# Patient Record
Sex: FEMALE | Race: WHITE | NOT HISPANIC OR LATINO | URBAN - METROPOLITAN AREA
[De-identification: names, ages, dates, MRNs, and addresses within clinical notes are randomized per-mention and may not be internally consistent; named-entity substitution may affect disease eponyms.]

---

## 2021-05-18 ENCOUNTER — EMERGENCY (EMERGENCY)
Facility: HOSPITAL | Age: 26
LOS: 1 days | Discharge: HOME | End: 2021-05-18
Attending: EMERGENCY MEDICINE | Admitting: EMERGENCY MEDICINE
Payer: MEDICAID

## 2021-05-18 VITALS
DIASTOLIC BLOOD PRESSURE: 64 MMHG | OXYGEN SATURATION: 99 % | SYSTOLIC BLOOD PRESSURE: 112 MMHG | RESPIRATION RATE: 18 BRPM | TEMPERATURE: 98 F | WEIGHT: 119.93 LBS | HEART RATE: 74 BPM | HEIGHT: 64 IN

## 2021-05-18 DIAGNOSIS — S00.83XA CONTUSION OF OTHER PART OF HEAD, INITIAL ENCOUNTER: ICD-10-CM
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
## 2021-05-18 DIAGNOSIS — S09.90XA UNSPECIFIED INJURY OF HEAD, INITIAL ENCOUNTER: ICD-10-CM

## 2021-05-18 PROCEDURE — 99283 EMERGENCY DEPT VISIT LOW MDM: CPT

## 2021-05-18 RX ORDER — ACETAMINOPHEN 500 MG
650 TABLET ORAL ONCE
Refills: 0 | Status: COMPLETED | OUTPATIENT
Start: 2021-05-18 | End: 2021-05-18

## 2021-05-18 RX ORDER — METHOCARBAMOL 500 MG/1
2 TABLET, FILM COATED ORAL
Qty: 15 | Refills: 0
Start: 2021-05-18 | End: 2021-05-22

## 2021-05-18 RX ORDER — IBUPROFEN 200 MG
600 TABLET ORAL ONCE
Refills: 0 | Status: COMPLETED | OUTPATIENT
Start: 2021-05-18 | End: 2021-05-18

## 2021-05-18 RX ORDER — METHOCARBAMOL 500 MG/1
1000 TABLET, FILM COATED ORAL ONCE
Refills: 0 | Status: COMPLETED | OUTPATIENT
Start: 2021-05-18 | End: 2021-05-18

## 2021-05-18 RX ADMIN — METHOCARBAMOL 1000 MILLIGRAM(S): 500 TABLET, FILM COATED ORAL at 15:58

## 2021-05-18 RX ADMIN — Medication 650 MILLIGRAM(S): at 15:58

## 2021-05-18 RX ADMIN — Medication 600 MILLIGRAM(S): at 15:58

## 2021-05-18 NOTE — ED ADULT NURSE NOTE - PRO INTERPRETER NEED 2
Bedside and Verbal shift change report given to KAITLIN Valdez Nurse RN  (oncoming nurse) by DENTON Clemente LPN (offgoing nurse). Report given with SBAR, Kardex, Intake/Output, MAR and Recent Results. English

## 2021-05-18 NOTE — ED PROVIDER NOTE - CLINICAL SUMMARY MEDICAL DECISION MAKING FREE TEXT BOX
Patient presenting with minor head injury, concussion precautions provided as well as reassurance. Will give some PO pain meds as her sides of neck are starting to get tight.

## 2021-05-18 NOTE — ED ADULT TRIAGE NOTE - CHIEF COMPLAINT QUOTE
pt. s/p mechanical fall hitting her head on the concrete while working out, small area of redness noted. Pt. denies LOC, nausea, vomiting or change in vision. No other visible injuries.

## 2021-05-18 NOTE — ED PROVIDER NOTE - OBJECTIVE STATEMENT
25 F here with head injury- she was doing aerial yoga and fell about 1 foot to the ground, hit her head. No LOC, + small bump, no n/v. She was a littl dizzy and shaken up at the time. The sides of her neck ar starting to get tight. No other complaints. No other injuries. Didn't take anything for it.

## 2021-05-18 NOTE — ED ADULT NURSE NOTE - OBJECTIVE STATEMENT
A&Ox3 pt presents c/o 2/10 headache 2ndary to head injury.  Pt denies LOC, dizziness or visual change.  Pt ambulates w/ steady gait.  No neurological deficit noted.

## 2021-05-18 NOTE — ED PROVIDER NOTE - PATIENT PORTAL LINK FT
You can access the FollowMyHealth Patient Portal offered by [facility] by registering at the following website: [email or web address]. By joining [facility]’s FollowMyHealth portal, you will also be able to view your health information using other applications (apps) compatible with our system.

## 2021-05-18 NOTE — ED PROVIDER NOTE - PHYSICAL EXAMINATION
VITAL SIGNS: I have reviewed nursing notes and confirm.  CONSTITUTIONAL: Well-developed; well-nourished; in no acute distress.  SKIN: Forehead 5cm hematoma.  HEAD: ~ 5cm flattish hematoma to upper forehead. No step off. ,   EYES: Pupils round bilaterally, 3mm; conjunctiva and sclera clear.  ENT: No other facial trauma.   NECK: Supple; non tender.  NEURO: Alert, oriented. Grossly unremarkable. PRUITT, normal tone, no gross motor or sensory changes. Fluent speech.   PSYCH: Cooperative, appropriate. Mood and affect wnl.

## 2025-02-13 NOTE — ED PROVIDER NOTE - NS_EDPROVIDERDISPOUSERTYPE_ED_A_ED
In an effort to ensure that our patients LiveWell, a Team Member has reviewed your chart and identified an opportunity to provide the best care possible. An attempt was made to discuss or schedule due or overdue Preventive or Chronic Condition care.Care Gaps identified: Diabetes A1c Testing and Urine Testing and Wellness Visits.    The Outcome was Contact was not made, no answer/busy. We are attempting to schedule a follow up office visit and yearly wellness visit. If you have any questions or need help with scheduling, contact your primary care provider..   Type of Appointment needed: Follow-up Visit   
Attending Attestation (For Attendings USE Only)...